# Patient Record
Sex: MALE
[De-identification: names, ages, dates, MRNs, and addresses within clinical notes are randomized per-mention and may not be internally consistent; named-entity substitution may affect disease eponyms.]

---

## 2021-12-13 ENCOUNTER — APPOINTMENT (OUTPATIENT)
Dept: MRI IMAGING | Facility: CLINIC | Age: 26
End: 2021-12-13
Payer: COMMERCIAL

## 2021-12-13 ENCOUNTER — TRANSCRIPTION ENCOUNTER (OUTPATIENT)
Age: 26
End: 2021-12-13

## 2021-12-13 ENCOUNTER — RESULT REVIEW (OUTPATIENT)
Age: 26
End: 2021-12-13

## 2021-12-13 ENCOUNTER — OUTPATIENT (OUTPATIENT)
Dept: OUTPATIENT SERVICES | Facility: HOSPITAL | Age: 26
LOS: 1 days | End: 2021-12-13
Payer: COMMERCIAL

## 2021-12-13 DIAGNOSIS — Z00.00 ENCOUNTER FOR GENERAL ADULT MEDICAL EXAMINATION WITHOUT ABNORMAL FINDINGS: ICD-10-CM

## 2021-12-13 PROCEDURE — 73721 MRI JNT OF LWR EXTRE W/O DYE: CPT | Mod: 26,LT

## 2021-12-13 PROCEDURE — 73721 MRI JNT OF LWR EXTRE W/O DYE: CPT

## 2022-07-11 ENCOUNTER — APPOINTMENT (OUTPATIENT)
Dept: ORTHOPEDIC SURGERY | Facility: CLINIC | Age: 27
End: 2022-07-11

## 2022-07-11 VITALS — HEIGHT: 72 IN | BODY MASS INDEX: 27.09 KG/M2 | WEIGHT: 200 LBS

## 2022-07-11 DIAGNOSIS — M54.50 LOW BACK PAIN, UNSPECIFIED: ICD-10-CM

## 2022-07-11 DIAGNOSIS — G89.29 LOW BACK PAIN, UNSPECIFIED: ICD-10-CM

## 2022-07-11 DIAGNOSIS — M22.2X2 PATELLOFEMORAL DISORDERS, RIGHT KNEE: ICD-10-CM

## 2022-07-11 DIAGNOSIS — M22.2X1 PATELLOFEMORAL DISORDERS, RIGHT KNEE: ICD-10-CM

## 2022-07-11 PROCEDURE — 99204 OFFICE O/P NEW MOD 45 MIN: CPT

## 2022-07-11 RX ORDER — FLUOXETINE HYDROCHLORIDE 10 MG/1
10 CAPSULE ORAL
Qty: 30 | Refills: 0 | Status: ACTIVE | COMMUNITY
Start: 2022-02-07

## 2022-07-11 RX ORDER — NAPROXEN 500 MG/1
500 TABLET ORAL
Qty: 60 | Refills: 0 | Status: ACTIVE | COMMUNITY
Start: 2022-07-11 | End: 1900-01-01

## 2022-07-11 RX ORDER — AMOXICILLIN AND CLAVULANATE POTASSIUM 875; 125 MG/1; MG/1
875-125 TABLET, COATED ORAL
Qty: 14 | Refills: 0 | Status: ACTIVE | COMMUNITY
Start: 2022-04-14

## 2022-07-11 RX ORDER — HALOPERIDOL 0.5 MG/1
0.5 TABLET ORAL
Qty: 56 | Refills: 0 | Status: ACTIVE | COMMUNITY
Start: 2022-07-06

## 2022-07-11 NOTE — HISTORY OF PRESENT ILLNESS
[de-identified] : Patient is here for b/l knee/low back pain that began 10 years ago. He feels it has worsened after heavy lifting and playing basketball. He has done nothing to treat it recently. Denies N/T/R/Prior injury. \par \par The patient's past medical history, past surgical history, medications and allergies were reviewed by me today and documented accordingly. In addition, the patient's family and social history, which were noncontributory to this visit, were reviewed also. Intake form was reviewed. The patient has no family history of arthritis.

## 2022-07-11 NOTE — PHYSICAL EXAM
[de-identified] : Constitutional: Well-nourished, well-developed, No acute distress\par Respiratory:  Good respiratory effort, no SOB\par Lymphatic: No regional lymphadenopathy, no lymphedema\par Psychiatric: Pleasant and normal affect, alert and oriented x3\par Skin: Clean dry and intact B/L LE\par Musculoskeletal: normal except where as noted in regional exam\par \par Lumbar Spine Exam\par \par APPEARANCE: no marked deformities or malalignment, normal curvature of the lumbosacral spine. good posture\par POSITIVE TENDERNESS: + Bilateral lumbar tenderness and spasm noted in erector spinae and quadratus lumborum\par NONTENDER: no bony midline tenderness\par ROM: full, although painful at end range of flexion and extension\par RESISTIVE TESTING: mildly painful 4/5 resisted flex/ext, sidebending b/l, and rotation\par SPECIAL TESTS: neg SLR b/l, neg SHAW b/l, neg Trendelenburg b/l \par PULSES: 2+ DP/PT pulses\par NEURO:  L1 - S2 intact to sensation and motor, DTRs 2+/4 patella and achilles\par \par Left Knee:\par APPEARANCE: no marked deformities, no swelling or malalignment\par POSITIVE TENDERNESS:  + crepitus of the anterior knee, and tenderness of patellar retinaculum\par NONTENDER: jt lines b/l, patellar & quadriceps tendons, MCL/LCL, ITB at the lateral femoral condyle & Gerdy's tubercle, pes bursa. \par ROM: full & painless, although some discomfort in deep knee flexion\par RESISTIVE TESTING: + discomfort with knee ext from deep knee flexion (stretched position), painless knee flexion. \par SPECIAL TESTS: stable v/v stress. painless grind. neg Lachman's. neg ant/post drawer. neg Herlinda's. \par \par Right Knee:\par APPEARANCE: no marked deformities, no swelling or malalignment\par POSITIVE TENDERNESS:  + crepitus of the anterior knee, and tenderness of patellar retinaculum\par NONTENDER: jt lines b/l, patellar & quadriceps tendons, MCL/LCL, ITB at the lateral femoral condyle & Gerdy's tubercle, pes bursa. \par ROM: full & painless, although some discomfort in deep knee flexion\par RESISTIVE TESTING: + discomfort with knee ext from deep knee flexion (stretched position), painless knee flexion. \par SPECIAL TESTS: stable v/v stress. painless grind. neg Lachman's. neg ant/post drawer. neg Herlinda's. \par \par

## 2022-07-11 NOTE — DISCUSSION/SUMMARY
[de-identified] : Discussed findings of today's exam and possible causes of patient's pain.  Educated patient on their most probable diagnosis of chronic bilateral knee pain with recent atraumatic exacerbation due to patellofemoral syndrome, with underlying etiology of weak proximal hip musculature and poor hip stability, as well as pes planus with mild foot pronation.  Patient is also diagnosed today with chronic low back pain without radiculopathy.  This is primarily postural related due to the patellofemoral pain syndrome, weak hip musculature, and compensatory core weakness and innominate rotation.  Reviewed possible courses of treatment, and we collaboratively decided best course of treatment at this time will include conservative management.  Patient recently sustained a left foot cuboid fracture, he has been under the care of an outside physician for this issue, he is recently fitted with custom orthotics which will address the pes planus issue and help with his biomechanical issues that were addressed today.  Patient will be started on a course of physical therapy to restore normal range of motion and strength as tolerated.  I educated the patient on the biomechanical exam that has contributed to development of patellofemoral pain syndrome.  I also advised the patient on the importance of maintaining a home exercise program and the goal of physical therapy is to teach this home exercise program for maintenance of hip strengthening exercises to provide proximal stability and decrease reactive forces at the patellofemoral compartment.  Patient inquired if cortisone injections would be helpful, he is advised I do not recommend injections for chronic 10 years of pain, I also do not recommend cortisone injections in a 27-year-old male as repeated cortisone injections can advance his degenerative changes and lead to early osteoarthritis.  Patient inquired if imaging would be necessary, he is advised that he has no recent injury, no trauma, no indication for x-rays as he is too young to have developed osteoarthritis already, and patient has no surgical indications, there is no need for MRI of the back or the knees at this time.  Patient is requesting a medication to help alleviate pain.  Patient started on a course of oral NSAIDs, prescription given for Naprosyn (We discussed all possible side effects of this medication).  Patient was advised that long-term use of oral NSAIDs can lead to GI upset, GI ulcer, and possible GI bleed, he is advised he should not be taking this medication on a regular basis long-term.  The medication should be used as short-term pain relief only.  Followup as needed.  Patient and his mother appreciate and agree with current plan.\par \par I work as part of an academic orthopedic group and routinely have a physician in training (resident / fellow) working with me.  Any part of the history and physical exam performed by the physician in training was either directly reviewed and/or replicated by myself.  Any procedure performed by the physician in training was performed under my direct supervision and with the consent of the patient.\par \par This note was generated using dragon medical dictation software.  A reasonable effort has been made for proofreading its contents, but typos may still remain.  If there are any questions or points of clarification needed please notify my office.

## 2023-04-11 ENCOUNTER — APPOINTMENT (OUTPATIENT)
Dept: TRAUMA SURGERY | Facility: HOSPITAL | Age: 28
End: 2023-04-11
Payer: COMMERCIAL

## 2023-04-11 VITALS
HEIGHT: 72 IN | TEMPERATURE: 98.2 F | BODY MASS INDEX: 29.39 KG/M2 | DIASTOLIC BLOOD PRESSURE: 82 MMHG | HEART RATE: 87 BPM | SYSTOLIC BLOOD PRESSURE: 117 MMHG | WEIGHT: 217 LBS

## 2023-04-11 DIAGNOSIS — R19.09 OTHER INTRA-ABDOMINAL AND PELVIC SWELLING, MASS AND LUMP: ICD-10-CM

## 2023-04-11 PROCEDURE — 99203 OFFICE O/P NEW LOW 30 MIN: CPT

## 2023-04-28 NOTE — ASSESSMENT
[FreeTextEntry1] : 29yo M with R groin mass, most likely lymph node. No current evidence of infection. No lymphadenopathy on the Left side. Possibly related to recent travel to [place]. Recommend inguinal ultrasound and follow up with PCP for further evaluation and monitoring of lymphadenopathy.

## 2023-04-28 NOTE — PHYSICAL EXAM
[Normal Breath Sounds] : Normal breath sounds [Normal Heart Sounds] : normal heart sounds [No Rash or Lesion] : No rash or lesion [Alert] : alert [Oriented to Person] : oriented to person [Oriented to Place] : oriented to place [Oriented to Time] : oriented to time [Calm] : calm [de-identified] : NAD, flat affect  [de-identified] : Soft, nondistended, nontender [de-identified] : R inguinal mass, approx 1.5x2cm, mildly tender, mobile, not reducible. L inguinal exam - no abnormality.  [de-identified] : No deformities, no infection in RLE

## 2023-04-28 NOTE — HISTORY OF PRESENT ILLNESS
[de-identified] : 29yo M with h/o bipolar presents with R groin lump x 1 month. Pt reports mass is tender to touch, has not increased in size since noticing it. Denies any fevers/chills. Denies any recent infections, including skin infections on the RLE. The bulge does not enlarge with valsalva and is not painful with lifting. Of note, he recently returned from work in [place].

## 2024-09-05 ENCOUNTER — APPOINTMENT (OUTPATIENT)
Dept: ORTHOPEDIC SURGERY | Facility: CLINIC | Age: 29
End: 2024-09-05
Payer: COMMERCIAL

## 2024-09-05 VITALS — WEIGHT: 179 LBS | HEIGHT: 72 IN | BODY MASS INDEX: 24.24 KG/M2

## 2024-09-05 DIAGNOSIS — M22.2X2 PATELLOFEMORAL DISORDERS, RIGHT KNEE: ICD-10-CM

## 2024-09-05 DIAGNOSIS — M22.2X1 PATELLOFEMORAL DISORDERS, RIGHT KNEE: ICD-10-CM

## 2024-09-05 PROCEDURE — 99214 OFFICE O/P EST MOD 30 MIN: CPT

## 2024-09-05 RX ORDER — DICLOFENAC SODIUM 10 MG/G
1 GEL TOPICAL
Qty: 1 | Refills: 1 | Status: ACTIVE | COMMUNITY
Start: 2024-09-05 | End: 1900-01-01

## 2024-09-05 NOTE — PHYSICAL EXAM
[de-identified] : Constitutional: Well-nourished, well-developed, No acute distress Respiratory:  Good respiratory effort, no SOB Lymphatic: No regional lymphadenopathy, no lymphedema Psychiatric: Pleasant and normal affect, alert and oriented x3 Skin: Clean dry and intact B/L LE Musculoskeletal: normal except where as noted in regional exam  Right Knee: APPEARANCE: no marked deformities, no swelling or malalignment POSITIVE TENDERNESS:  + crepitus of the anterior knee, and tenderness of patellar retinaculum NONTENDER: jt lines b/l, patellar & quadriceps tendons, MCL/LCL, ITB at the lateral femoral condyle & Gerdy's tubercle, pes bursa.  ROM: full & painless, although some discomfort in deep knee flexion RESISTIVE TESTING: + discomfort with knee ext from deep knee flexion (stretched position), painless knee flexion.  SPECIAL TESTS: stable v/v stress. painless grind. neg Lachman's. neg ant/post drawer. neg Herlinda's.

## 2024-09-05 NOTE — HISTORY OF PRESENT ILLNESS
[de-identified] : 29 M previously seen for bilateral knee pain in 2022 He did physical therapy in 2022 without much improvement He presents for followup with R>L knee pain He states that he has been bicycling a lot lately and his right leg feels heavier and weaker than his left he endorses pan in the anterior knee, worse with bicycling, going up stairs,  He states that he cannot jump as high as he should be able to  He takes Tylenol for pain, he takes muscle relaxer for essential tremor., he states that he was told he cannot take Tylenol or NSAID he states  Denies any recent trauma

## 2024-09-05 NOTE — DISCUSSION/SUMMARY
[de-identified] : Patient was seen today for reevaluation of chronic intermittent right knee pain with recent atraumatic exacerbation due to recurrence of patellofemoral pain syndrome.  Patient still has no reported injury or trauma to the area, no evidence of internal derangement, no need for x-ray or MRI at this time.  Patient appears to have primarily extra-articular pain as etiology of his condition.  He was educated utilizing online Google images of patellofemoral pain syndrome and all the different tissues that interact with the patella to help it track.  Patient likely has patellar maltracking as primary cause of his intermittent tendinitis.  Patient started on a course of topical NSAIDs, prescription given for diclofenac gel 1% to be applied to the area of pain 2-3 times daily as needed (We discussed all possible side effects of this medication).  Recommend a new course of physical therapy.  Recommend a trial of over-the-counter knee compression sleeve with a patellar cutout to be worn during the day for support and certainly when playing basketball.  The patient is not having knee pain, he does have stiffness when on the bike/elliptical, and feels like he cannot jump as high when playing basketball.  Patient requested cortisone injection again today.  Patient is advised that this is not indicated at this time as he does not appear to have intra-articular pathology, cortisone injection is primarily for pain relief and he is not specifically having pain, and he is of at a very young age to consider cortisone injection for chronic pain.  I advised the patient we do not really consider injections for extra-articular knee pain, nor do we consider injections for intra-articular pathology unless documented on MRI and usually 40 years of age or above.  Patient was also advised to consider trial of over-the-counter turmeric/curcumin, or possibly trial of supplement glucosamine/chondroitin to help with chronic pain.  He states he was advised that he cannot take oral NSAIDs or Tylenol while he is on his high dose muscle relaxant for his Tourette's syndrome.  Follow up as needed.  Patient appreciates and agrees with current plan.  This note was generated using dragon medical dictation software.  A reasonable effort has been made for proofreading its contents, but typos may still remain.  If there are any questions or points of clarification needed please notify my office.

## 2024-10-08 ENCOUNTER — NON-APPOINTMENT (OUTPATIENT)
Age: 29
End: 2024-10-08

## 2024-10-08 ENCOUNTER — APPOINTMENT (OUTPATIENT)
Dept: NEUROSURGERY | Facility: CLINIC | Age: 29
End: 2024-10-08
Payer: COMMERCIAL

## 2024-10-08 VITALS
OXYGEN SATURATION: 76 % | WEIGHT: 179 LBS | BODY MASS INDEX: 24.24 KG/M2 | HEART RATE: 97 BPM | HEIGHT: 72 IN | DIASTOLIC BLOOD PRESSURE: 86 MMHG | SYSTOLIC BLOOD PRESSURE: 134 MMHG

## 2024-10-08 DIAGNOSIS — Z80.9 FAMILY HISTORY OF MALIGNANT NEOPLASM, UNSPECIFIED: ICD-10-CM

## 2024-10-08 DIAGNOSIS — Z82.49 FAMILY HISTORY OF ISCHEMIC HEART DISEASE AND OTHER DISEASES OF THE CIRCULATORY SYSTEM: ICD-10-CM

## 2024-10-08 DIAGNOSIS — Z83.3 FAMILY HISTORY OF DIABETES MELLITUS: ICD-10-CM

## 2024-10-08 DIAGNOSIS — F12.91 CANNABIS USE, UNSPECIFIED, IN REMISSION: ICD-10-CM

## 2024-10-08 DIAGNOSIS — G24.3 SPASMODIC TORTICOLLIS: ICD-10-CM

## 2024-10-08 PROCEDURE — 99205 OFFICE O/P NEW HI 60 MIN: CPT

## 2024-10-10 PROBLEM — Z80.9 FAMILY HISTORY OF MALIGNANT NEOPLASM: Status: ACTIVE | Noted: 2024-10-08

## 2024-10-10 PROBLEM — F12.91 HISTORY OF MARIJUANA USE: Status: ACTIVE | Noted: 2024-10-08

## 2024-10-10 PROBLEM — Z82.49 FAMILY HISTORY OF HYPERTENSION: Status: ACTIVE | Noted: 2024-10-08

## 2024-10-10 PROBLEM — Z82.49 FAMILY HISTORY OF MYOCARDIAL INFARCTION: Status: ACTIVE | Noted: 2024-10-08

## 2024-10-10 PROBLEM — Z83.3 FAMILY HISTORY OF DIABETES MELLITUS: Status: ACTIVE | Noted: 2024-10-08

## 2024-10-10 RX ORDER — BACLOFEN 15 MG/1
TABLET ORAL
Refills: 0 | Status: ACTIVE | COMMUNITY